# Patient Record
Sex: MALE | Race: WHITE | Employment: STUDENT | URBAN - NONMETROPOLITAN AREA
[De-identification: names, ages, dates, MRNs, and addresses within clinical notes are randomized per-mention and may not be internally consistent; named-entity substitution may affect disease eponyms.]

---

## 2022-04-28 ENCOUNTER — HOSPITAL ENCOUNTER (EMERGENCY)
Age: 22
Discharge: HOME | End: 2022-04-28
Payer: COMMERCIAL

## 2022-04-28 VITALS
HEART RATE: 74 BPM | RESPIRATION RATE: 16 BRPM | HEIGHT: 75 IN | SYSTOLIC BLOOD PRESSURE: 168 MMHG | BODY MASS INDEX: 23 KG/M2 | WEIGHT: 185 LBS | TEMPERATURE: 99.1 F | OXYGEN SATURATION: 98 % | DIASTOLIC BLOOD PRESSURE: 88 MMHG

## 2022-04-28 DIAGNOSIS — H10.9 CONJUNCTIVITIS OF LEFT EYE, UNSPECIFIED CONJUNCTIVITIS TYPE: Primary | ICD-10-CM

## 2022-04-28 PROCEDURE — 99203 OFFICE O/P NEW LOW 30 MIN: CPT

## 2022-04-28 PROCEDURE — 99202 OFFICE O/P NEW SF 15 MIN: CPT | Performed by: EMERGENCY MEDICINE

## 2022-04-28 RX ORDER — POLYMYXIN B SULFATE AND TRIMETHOPRIM 1; 10000 MG/ML; [USP'U]/ML
1 SOLUTION OPHTHALMIC EVERY 4 HOURS
Qty: 1 EACH | Refills: 0 | Status: SHIPPED | OUTPATIENT
Start: 2022-04-28 | End: 2022-04-30 | Stop reason: ALTCHOICE

## 2022-04-28 RX ORDER — CETIRIZINE HYDROCHLORIDE 10 MG/1
10 TABLET ORAL DAILY
COMMUNITY

## 2022-04-28 ASSESSMENT — ENCOUNTER SYMPTOMS
SINUS PRESSURE: 0
SORE THROAT: 0
EYE PAIN: 1
SHORTNESS OF BREATH: 0
EYE ITCHING: 1
SINUS PAIN: 0
PHOTOPHOBIA: 0
RHINORRHEA: 1
EYE REDNESS: 1
COUGH: 0

## 2022-04-28 ASSESSMENT — VISUAL ACUITY
OD: 25 FT
OU: 20 FT
OS: 20 FT
OU: 1

## 2022-04-28 NOTE — ED TRIAGE NOTES
Pt presents to [facility] with c/o left eye redness that started 2 days prior. Pt denies any known injury to the eye or discharge.

## 2022-04-28 NOTE — ED PROVIDER NOTES
[facility]  Urgent Care Encounter       CHIEF COMPLAINT       Chief Complaint   Patient presents with    Eye Problem     left       Nurses Notes reviewed and I agree except as noted in the HPI. HISTORY OF PRESENT ILLNESS   Beatriz Cochran is a 22 y.o. male who presents for complaints of pain, itchiness and redness to the left eye. Symptoms for 2 to 3 days. Denies any injury. States left eye is watering more than normal.  He had crusty drainage from inside of the left eye this morning when he woke up. No visual loss or disturbance. Patient does have history of seasonal allergies and does get itchy eyes during allergy season, however, he has never had symptoms like this. He is taking Zyrtec and Flonase for his allergies. HPI    REVIEW OF SYSTEMS     Review of Systems   Constitutional: Negative for fatigue and fever. HENT: Positive for rhinorrhea. Negative for sinus pressure, sinus pain and sore throat. Eyes: Positive for pain, redness and itching. Negative for photophobia and visual disturbance. Respiratory: Negative for cough and shortness of breath. Neurological: Negative for dizziness and headaches. PAST MEDICAL HISTORY   No past medical history on file. SURGICALHISTORY     Patient  has no past surgical history on file. CURRENT MEDICATIONS       Previous Medications    CETIRIZINE (ZYRTEC) 10 MG TABLET    Take 10 mg by mouth daily       ALLERGIES     Patient is has No Known Allergies. Patients   There is no immunization history on file for this patient. FAMILY HISTORY     Patient's family history is not on file. SOCIAL HISTORY     Patient      PHYSICAL EXAM     ED TRIAGE VITALS  BP: (!) 168/88, Temp: 99.1 °F (37.3 °C), Pulse: 74, Resp: 16, SpO2: 98 %,Estimated body mass index is 23.12 kg/m² as calculated from the following:    Height as of this encounter: 6' 3\" (1.905 m). Weight as of this encounter: 185 lb (83.9 kg). ,No LMP for male patient. Physical Exam  Constitutional:       General: He is not in acute distress. Appearance: He is normal weight. He is not ill-appearing. HENT:      Head: Normocephalic. Nose: Nose normal.      Mouth/Throat:      Mouth: Mucous membranes are moist.   Eyes:      General: Lids are normal. Vision grossly intact. Gaze aligned appropriately. Left eye: No foreign body, discharge or hordeolum. Conjunctiva/sclera:      Left eye: Left conjunctiva is injected. No chemosis or exudate. Comments: Conjunctive left eye injected, possible subconjunctival hemorrhage to the upper portion. No foreign body, no hyphema, no anterior chamber bulge   Cardiovascular:      Rate and Rhythm: Normal rate. Pulmonary:      Effort: Pulmonary effort is normal.   Skin:     General: Skin is warm. Neurological:      General: No focal deficit present. Mental Status: He is alert. Psychiatric:         Mood and Affect: Mood normal.         Behavior: Behavior normal.         DIAGNOSTIC RESULTS     Labs:No results found for this visit on 04/28/22. IMAGING:    No orders to display         EKG:      URGENT CARE COURSE:     Vitals:    04/28/22 1318 04/28/22 1330   BP: (!) 165/99 (!) 168/88   Pulse: 74    Resp: 16    Temp: 99.1 °F (37.3 °C)    TempSrc: Temporal    SpO2: 98%    Weight: 185 lb (83.9 kg)    Height: 6' 3\" (1.905 m)        Medications - No data to display         PROCEDURES:  None    FINAL IMPRESSION      1. Conjunctivitis of left eye, unspecified conjunctivitis type          DISPOSITION/ PLAN     Presents for what is likely conjunctivitis, left eye. Will treat with Polytrim ophthalmic drops. Is also advised to continue his allergy medications in case this is allergy related, although less likely. Advise follow-up with optometry, ophthalmology or return here if symptoms do not improve 3 to 4 days. Sooner if worse. Patient had noted hypertension during the visit.   Repeat blood pressure improved but remains slightly elevated. Advise follow-up primary care provider regarding this. PATIENT REFERRED TO:  No primary care provider on file. No primary physician on file.       DISCHARGE MEDICATIONS:  New Prescriptions    TRIMETHOPRIM-POLYMYXIN B (POLYTRIM) 57682-4.1 UNIT/ML-% OPHTHALMIC SOLUTION    Place 1 drop into the left eye every 4 hours for 7 days       Discontinued Medications    No medications on file       Current Discharge Medication List          Alena Richmond, ANGELITO - CNP    (Please note that portions of this note were completed with a voice recognition program. Efforts were made to edit the dictations but occasionally words are mis-transcribed.)          ANGELITO Randall - CNP  04/28/22 8594

## 2022-04-30 ENCOUNTER — HOSPITAL ENCOUNTER (EMERGENCY)
Age: 22
Discharge: HOME | End: 2022-04-30
Payer: COMMERCIAL

## 2022-04-30 VITALS
HEART RATE: 96 BPM | HEIGHT: 75 IN | SYSTOLIC BLOOD PRESSURE: 165 MMHG | OXYGEN SATURATION: 98 % | WEIGHT: 185 LBS | TEMPERATURE: 97.9 F | RESPIRATION RATE: 16 BRPM | BODY MASS INDEX: 23 KG/M2 | DIASTOLIC BLOOD PRESSURE: 78 MMHG

## 2022-04-30 DIAGNOSIS — J30.2 SEASONAL ALLERGIES: Primary | ICD-10-CM

## 2022-04-30 DIAGNOSIS — H10.32 ACUTE CONJUNCTIVITIS OF LEFT EYE, UNSPECIFIED ACUTE CONJUNCTIVITIS TYPE: ICD-10-CM

## 2022-04-30 PROCEDURE — 96372 THER/PROPH/DIAG INJ SC/IM: CPT

## 2022-04-30 PROCEDURE — 6360000002 HC RX W HCPCS: Performed by: NURSE PRACTITIONER

## 2022-04-30 PROCEDURE — 99213 OFFICE O/P EST LOW 20 MIN: CPT | Performed by: NURSE PRACTITIONER

## 2022-04-30 PROCEDURE — 99213 OFFICE O/P EST LOW 20 MIN: CPT

## 2022-04-30 RX ORDER — GENTAMICIN SULFATE 3 MG/ML
1 SOLUTION/ DROPS OPHTHALMIC 4 TIMES DAILY
Qty: 1 EACH | Refills: 0 | Status: SHIPPED | OUTPATIENT
Start: 2022-04-30 | End: 2022-05-07

## 2022-04-30 RX ORDER — METHYLPREDNISOLONE ACETATE 80 MG/ML
80 INJECTION, SUSPENSION INTRA-ARTICULAR; INTRALESIONAL; INTRAMUSCULAR; SOFT TISSUE ONCE
Status: COMPLETED | OUTPATIENT
Start: 2022-04-30 | End: 2022-04-30

## 2022-04-30 RX ADMIN — METHYLPREDNISOLONE ACETATE 80 MG: 80 INJECTION, SUSPENSION INTRA-ARTICULAR; INTRALESIONAL; INTRAMUSCULAR; SOFT TISSUE at 12:37

## 2022-04-30 ASSESSMENT — ENCOUNTER SYMPTOMS
VOMITING: 0
RHINORRHEA: 0
EYE PAIN: 0
SHORTNESS OF BREATH: 0
TROUBLE SWALLOWING: 0
EYE DISCHARGE: 1
DIARRHEA: 0
SORE THROAT: 0
NAUSEA: 0
COUGH: 0
PHOTOPHOBIA: 1
EYE REDNESS: 1

## 2022-04-30 ASSESSMENT — PAIN - FUNCTIONAL ASSESSMENT: PAIN_FUNCTIONAL_ASSESSMENT: NONE - DENIES PAIN

## 2022-04-30 NOTE — ED PROVIDER NOTES
[id number]       Chief Complaint   Patient presents with    Eye Problem       Nurses Notes reviewed and I agree except as noted in the HPI. HISTORY OF PRESENT ILLNESS   Andrea Magdaleno is a 22 y.o. male who presents with his mother for left eye swelling, redness, drainage. Onset between 2 and 3 days ago, worsening. Patient believes he may have been exposed to cat hair/dander. No injury. No foreign body. Patient was evaluated and treated at this facility 2 days ago, placed on Polytrim. Yesterday patient had followed up with a [facility] provider. He was prescribed ofloxacin drops. Patient then took some neomycin drops one of his friends. Symptoms worsening. No bright flashes of light. No loss of vision. REVIEW OF SYSTEMS     Review of Systems   Constitutional: Negative for chills, diaphoresis, fatigue and fever. HENT: Negative for congestion, ear pain, rhinorrhea, sore throat and trouble swallowing. Eyes: Positive for photophobia, discharge and redness. Negative for pain and visual disturbance. Respiratory: Negative for cough and shortness of breath. Cardiovascular: Negative for chest pain. Gastrointestinal: Negative for diarrhea, nausea and vomiting. Genitourinary: Negative for decreased urine volume. Musculoskeletal: Negative for neck pain and neck stiffness. Skin: Negative for rash. Neurological: Negative for headaches. Hematological: Negative for adenopathy. Psychiatric/Behavioral: Negative for sleep disturbance. PAST MEDICAL HISTORY   History reviewed. No pertinent past medical history. SURGICAL HISTORY     Patient  has no past surgical history on file.     CURRENT MEDICATIONS       Discharge Medication List as of 4/30/2022 12:43 PM      CONTINUE these medications which have NOT CHANGED    Details   cetirizine (ZYRTEC) 10 MG tablet Take 10 mg by mouth dailyHistorical Med             ALLERGIES Patient is has No Known Allergies. FAMILY HISTORY     Patient'sfamily history is not on file. SOCIAL HISTORY     Patient  reports that he has never smoked. He has never used smokeless tobacco.    PHYSICAL EXAM     ED TRIAGE VITALS  BP: (!) 165/78, Temp: 97.9 °F (36.6 °C), Pulse: 96, Resp: 16, SpO2: 98 %  Physical Exam  Vitals and nursing note reviewed. Constitutional:       General: He is not in acute distress. Appearance: Normal appearance. He is well-developed. He is not ill-appearing, toxic-appearing or diaphoretic. HENT:      Head: Normocephalic and atraumatic. Jaw: No trismus. Right Ear: Hearing, tympanic membrane, ear canal and external ear normal. No mastoid tenderness. No hemotympanum. Tympanic membrane is not perforated, erythematous or bulging. Left Ear: Hearing, tympanic membrane, ear canal and external ear normal. No mastoid tenderness. No hemotympanum. Tympanic membrane is not perforated, erythematous or bulging. Nose: Nose normal.      Mouth/Throat:      Mouth: Mucous membranes are moist.      Pharynx: Oropharynx is clear. Uvula midline. Tonsils: No tonsillar abscesses. Eyes:      General: No scleral icterus. Left eye: Discharge (watery) present. No foreign body or hordeolum. Conjunctiva/sclera:      Left eye: Left conjunctiva is injected. No exudate. Neck:      Thyroid: No thyromegaly. Trachea: Trachea normal.   Cardiovascular:      Rate and Rhythm: Normal rate and regular rhythm. No extrasystoles are present. Chest Wall: PMI is not displaced. Heart sounds: Normal heart sounds. No murmur heard. No friction rub. No gallop. Pulmonary:      Effort: Pulmonary effort is normal. No accessory muscle usage or respiratory distress. Breath sounds: Normal breath sounds. Chest:   Breasts:      Right: No supraclavicular adenopathy. Left: No supraclavicular adenopathy.        Musculoskeletal:      Cervical back: Normal range of motion and neck supple. Lymphadenopathy:      Head:      Right side of head: No submental, submandibular, tonsillar, preauricular, posterior auricular or occipital adenopathy. Left side of head: No submental, submandibular, tonsillar, preauricular, posterior auricular or occipital adenopathy. Cervical: No cervical adenopathy. Upper Body:      Right upper body: No supraclavicular adenopathy. Left upper body: No supraclavicular adenopathy. Skin:     General: Skin is warm and dry. Coloration: Skin is not pale. Findings: No rash. Comments: Skin intact, warm and dry to touch, no rashes noted on exposed surfaces. Neurological:      Mental Status: He is alert and oriented to person, place, and time. He is not disoriented. Psychiatric:         Mood and Affect: Mood normal.         Behavior: Behavior is cooperative. DIAGNOSTIC RESULTS   Labs: No results found for this visit on 04/30/22. IMAGING:  No orders to display     URGENT CARE COURSE:     Vitals:    04/30/22 1156   BP: (!) 165/78   Pulse: 96   Resp: 16   Temp: 97.9 °F (36.6 °C)   SpO2: 98%   Weight: 185 lb (83.9 kg)   Height: 6' 3\" (1.905 m)       Medications   methylPREDNISolone acetate (DEPO-MEDROL) injection 80 mg (80 mg IntraMUSCular Given 4/30/22 1237)     PROCEDURES:  None  FINALIMPRESSION      1. Seasonal allergies    2. Acute conjunctivitis of left eye, unspecified acute conjunctivitis type        DISPOSITION/PLAN   DISPOSITION Decision To Discharge 04/30/2022 12:42:50 PM  Nontoxic, no distress. Exam consistent with conjunctivitis unspecified. Stop all current medication/drops, medication as prescribed. Patient's mother also requested steroid injection for seasonal allergies. Patient given Depo-Medrol 80 mg during visit. Continue current allergy medication. Follow-up with ophthalmology as needed.   If any distress go to ER  PATIENT REFERRED TO:  [facility]  [address] [address] [facility] [address] [place] [address] Rd    Establish care as needed. Medication as prescribed. Benadryl, ibuprofen over-the-counter. Cool compresses. If symptoms worsen go to ER. Larry Segovia MD  [address] [facility] [address] [facility]      Follow up as needed.     DISCHARGE MEDICATIONS:  Discharge Medication List as of 4/30/2022 12:43 PM      START taking these medications    Details   gentamicin (GARAMYCIN) 0.3 % ophthalmic solution Place 1 drop into the left eye 4 times daily for 7 days, Disp-1 each, R-0Normal           Discharge Medication List as of 4/30/2022 12:43 PM          ANGELITO Ramires CNP, APRN - CNP  04/30/22 6812

## 2022-04-30 NOTE — ED NOTES
To [facility] with complaints of left eye swelling. Started 2 days ago with redness in eye. Seen here. Given polymixin drops. Developed swelling. Called [facility] who gave him ofloxacin. No better. Also tried neomycin from friend. States it was painful last night.  Not as bad today     Ez Zimmerman RN  04/30/22 1200

## 2022-04-30 NOTE — LETTER
[facility]  [address] [place] [address]  Phone: [phone]               April 30, 2022    Patient: Jono Story   YOB: 2000   Date of Visit: 4/30/2022       To Whom It May Concern:    Jono Story was seen and treated in our emergency department on 4/30/2022. He may return to work on 5/1/22.       Sincerely,       Lexis Gabriel LPN         Signature:Electronically signed by Lexis Gabriel LPN on 2/98/7228 at 94:32 PM

## 2022-04-30 NOTE — LETTER
[facility]  [address] [place] [address] Drive [address]  Phone: [phone]               April 30, 2022    Patient: Rashaad Castro   YOB: 2000   Date of Visit: 4/30/2022       To Whom It May Concern:    Rashaad Castro was seen and treated in our emergency department on 4/30/2022. He may return to school on 5/1/22.       Sincerely,       Jase Rosario LPN         Signature:Electronically signed by Jase Rosario LPN on 3/18/2473 at 64:00 PM